# Patient Record
Sex: FEMALE | Race: WHITE | NOT HISPANIC OR LATINO | URBAN - METROPOLITAN AREA
[De-identification: names, ages, dates, MRNs, and addresses within clinical notes are randomized per-mention and may not be internally consistent; named-entity substitution may affect disease eponyms.]

---

## 2024-10-18 ENCOUNTER — OFFICE VISIT (OUTPATIENT)
Dept: URGENT CARE | Age: 39
End: 2024-10-18
Payer: OTHER GOVERNMENT

## 2024-10-18 VITALS
SYSTOLIC BLOOD PRESSURE: 126 MMHG | TEMPERATURE: 99.3 F | WEIGHT: 187 LBS | HEART RATE: 81 BPM | OXYGEN SATURATION: 97 % | DIASTOLIC BLOOD PRESSURE: 84 MMHG

## 2024-10-18 DIAGNOSIS — R09.82 POST-NASAL DRAINAGE: Primary | ICD-10-CM

## 2024-10-18 DIAGNOSIS — R06.02 POST-COVID CHRONIC SHORTNESS OF BREATH: ICD-10-CM

## 2024-10-18 DIAGNOSIS — J01.10 SUBACUTE FRONTAL SINUSITIS: ICD-10-CM

## 2024-10-18 DIAGNOSIS — H65.01 RIGHT ACUTE SEROUS OTITIS MEDIA, RECURRENCE NOT SPECIFIED: ICD-10-CM

## 2024-10-18 DIAGNOSIS — U09.9 POST-COVID CHRONIC SHORTNESS OF BREATH: ICD-10-CM

## 2024-10-18 DIAGNOSIS — R05.1 ACUTE COUGH: ICD-10-CM

## 2024-10-18 LAB
POC RAPID INFLUENZA A: NEGATIVE
POC RAPID INFLUENZA B: NEGATIVE

## 2024-10-18 RX ORDER — ALBUTEROL SULFATE 90 UG/1
2 INHALANT RESPIRATORY (INHALATION) EVERY 6 HOURS PRN
Qty: 18 G | Refills: 0 | Status: SHIPPED | OUTPATIENT
Start: 2024-10-18 | End: 2025-10-18

## 2024-10-18 RX ORDER — DOXYCYCLINE HYCLATE 100 MG
100 TABLET ORAL 2 TIMES DAILY
Qty: 20 TABLET | Refills: 0 | Status: SHIPPED | OUTPATIENT
Start: 2024-10-18 | End: 2024-10-25

## 2024-10-18 ASSESSMENT — ENCOUNTER SYMPTOMS
ENDOCRINE NEGATIVE: 1
GASTROINTESTINAL NEGATIVE: 1
EYES NEGATIVE: 1
SORE THROAT: 1
CARDIOVASCULAR NEGATIVE: 1
COUGH: 1
SINUS PRESSURE: 1

## 2024-10-18 NOTE — PROGRESS NOTES
Subjective   Patient ID: Caitlin Kelley is a 39 y.o. female. They present today with a chief complaint of Cough (Patient states cough for 2 weeks getting worse. Patient states upper chest burning when coughing and breathing started today. ).    History of Present Illness  38 y/o F c/o Cough Patient states cough for 2 weeks getting worse. Patient states upper chest burning when coughing and breathing started today. Pt was diagnosed with Covid 19 1 month ago and then not too long after s/s started with progression and cough worse at night and AM. Denies SOB, CP, fever, chills, n/v      Cough  Associated symptoms include a sore throat.       Past Medical History  Allergies as of 10/18/2024 - Reviewed 10/18/2024   Allergen Reaction Noted    Penicillins Other 10/18/2024       (Not in a hospital admission)       History reviewed. No pertinent past medical history.    History reviewed. No pertinent surgical history.     reports that she has never smoked. She has never used smokeless tobacco.    Review of Systems  Review of Systems   HENT:  Positive for congestion, sinus pressure and sore throat.    Eyes: Negative.    Respiratory:  Positive for cough.    Cardiovascular: Negative.    Gastrointestinal: Negative.    Endocrine: Negative.    Genitourinary: Negative.                                   Objective    Vitals:    10/18/24 1728   BP: 126/84   Pulse: 81   Temp: 37.4 °C (99.3 °F)   SpO2: 97%   Weight: 84.8 kg (187 lb)     No LMP recorded.    Physical Exam  Constitutional:       Appearance: Normal appearance.   HENT:      Head: Normocephalic.      Right Ear: Tympanic membrane normal.      Left Ear: Tympanic membrane normal.      Ears:      Comments: Right TM with bulging/erythema/opacification     Nose:      Right Turbinates: Enlarged.      Left Turbinates: Enlarged.      Right Sinus: Frontal sinus tenderness present.      Mouth/Throat:      Mouth: Mucous membranes are dry.      Pharynx: Oropharynx is clear. Postnasal drip  present.   Eyes:      Extraocular Movements: Extraocular movements intact.      Pupils: Pupils are equal, round, and reactive to light.   Neck:      Comments: Right AC with tenderness  Cardiovascular:      Rate and Rhythm: Normal rate and regular rhythm.      Pulses: Normal pulses.      Heart sounds: Normal heart sounds.   Pulmonary:      Effort: Pulmonary effort is normal.      Breath sounds: Normal breath sounds.   Musculoskeletal:         General: Normal range of motion.      Cervical back: Normal range of motion and neck supple.   Lymphadenopathy:      Cervical: Cervical adenopathy present.   Skin:     General: Skin is warm and dry.   Neurological:      General: No focal deficit present.      Mental Status: She is alert and oriented to person, place, and time.   Psychiatric:         Mood and Affect: Mood normal.         Behavior: Behavior normal.         Procedures    Point of Care Test & Imaging Results from this visit  Results for orders placed or performed in visit on 10/18/24   POCT Influenza A/B manually resulted   Result Value Ref Range    POC Rapid Influenza A Negative Negative    POC Rapid Influenza B Negative Negative      No results found.    Diagnostic study results (if any) were reviewed by PRINCE Ball.    Assessment/Plan   Allergies, medications, history, and pertinent labs/EKGs/Imaging reviewed by PRINCE Ball.     Medical Decision Making  Rapid flu- NEG  Per s/s clinical features as well as duration and abnormal PE, diagnosis is consistent with right AOM aND suspect sinusitis so will treat with DOXYCYCLINE, and advised to implement normal saline mist spray, flonase daily, and if s/s persist after 24-48 hours to f/u with PCP    refrain from swimming and flying until f/u with PCP  Normal saline mist spray 3 times a day to clear out sinuses and reduce PND  Advised to take daily anti-histamines   alternate Tylenol and Motrin PRN for discomfort  gargle with warm water and  salt  f/u PCP 2-3 days  Take medication as prescribed and incorporate probiotics for gut health  normal saline mist spray three times and day and flonase daily  Increase fluids, to help thin congestion. You might use a cool mist humidifier/vaporizer in your room if the air is dry. This will help thin congestion and keep your sinus moist.  Sleeping in a more upright position can be helpful.  Using saline nose drops or mists can also help thick sinus congestion drain. Apply the mist or drops, then tip your head back and rotate from side to side to help    You can use acetaminophen (paracetamol, Tylenol) or ibuprofen (Motrin) for fever or headache. Drink warm teas with honey.  If you do not improve or you begin to worsen please follow up with your primary care physician.      HOME CARE INSTRUCTIONS:  --- Drink plenty of water. Water helps thin the mucus so your sinuses can drain more easily.  --- Use a humidifier.  --- Inhale steam 3 to 4 times a day (for example, sit in the bathroom with the shower running).  --- Apply a warm, moist washcloth to your face 3 to 4 times a day, or as directed by your caregiver.  --- Take over-the-counter or prescription medicines for pain, discomfort, or fever only as directed by your caregiver.    SEEK FURTHER TREATMENT IF YOU:  --- You have increasing pain or severe headaches.  --- You have nausea, vomiting, or drowsiness.  --- You have swelling around your face.  --- You have vision problems.  --- You have a stiff neck.  --- You have difficulty breathing.      Orders and Diagnoses  Diagnoses and all orders for this visit:  Post-nasal drainage  Acute cough  -     POCT Influenza A/B manually resulted  -     albuterol 90 mcg/actuation inhaler; Inhale 2 puffs every 6 hours if needed for wheezing or shortness of breath.  Subacute frontal sinusitis  -     doxycycline (Vibra-Tabs) 100 mg tablet; Take 1 tablet (100 mg) by mouth 2 times a day for 7 days. Take with a full glass of water and do  not lie down for at least 30 minutes after.  Right acute serous otitis media, recurrence not specified  -     doxycycline (Vibra-Tabs) 100 mg tablet; Take 1 tablet (100 mg) by mouth 2 times a day for 7 days. Take with a full glass of water and do not lie down for at least 30 minutes after.  Post-COVID chronic shortness of breath  -     albuterol 90 mcg/actuation inhaler; Inhale 2 puffs every 6 hours if needed for wheezing or shortness of breath.      Medical Admin Record      Patient disposition: Home    Electronically signed by PRINCE Ball  6:13 PM

## 2025-01-23 ENCOUNTER — OFFICE VISIT (OUTPATIENT)
Dept: URGENT CARE | Age: 40
End: 2025-01-23
Payer: OTHER GOVERNMENT

## 2025-01-23 VITALS
SYSTOLIC BLOOD PRESSURE: 124 MMHG | DIASTOLIC BLOOD PRESSURE: 76 MMHG | TEMPERATURE: 98.4 F | RESPIRATION RATE: 20 BRPM | OXYGEN SATURATION: 98 % | HEART RATE: 75 BPM

## 2025-01-23 DIAGNOSIS — H66.003 ACUTE SUPPURATIVE OTITIS MEDIA OF BOTH EARS WITHOUT SPONTANEOUS RUPTURE OF TYMPANIC MEMBRANES, RECURRENCE NOT SPECIFIED: ICD-10-CM

## 2025-01-23 DIAGNOSIS — H60.331 ACUTE SWIMMER'S EAR OF RIGHT SIDE: Primary | ICD-10-CM

## 2025-01-23 RX ORDER — CLINDAMYCIN HYDROCHLORIDE 300 MG/1
300 CAPSULE ORAL 3 TIMES DAILY
Qty: 21 CAPSULE | Refills: 0 | Status: SHIPPED | OUTPATIENT
Start: 2025-01-23 | End: 2025-01-30

## 2025-01-23 RX ORDER — NEOMYCIN SULFATE, POLYMYXIN B SULFATE, HYDROCORTISONE 3.5; 10000; 1 MG/ML; [USP'U]/ML; MG/ML
3 SOLUTION/ DROPS AURICULAR (OTIC) 4 TIMES DAILY
Qty: 6 ML | Refills: 0 | Status: SHIPPED | OUTPATIENT
Start: 2025-01-23 | End: 2025-02-02

## 2025-01-23 ASSESSMENT — PAIN SCALES - GENERAL: PAINLEVEL_OUTOF10: 4

## 2025-01-23 NOTE — PROGRESS NOTES
Subjective   Patient ID: Caitlin Kelley is a 39 y.o. female. They present today with a chief complaint of Earache.    History of Present Illness    Earache         Past Medical History  Allergies as of 01/23/2025 - Reviewed 01/23/2025   Allergen Reaction Noted    Penicillins Other 10/18/2024       (Not in a hospital admission)       No past medical history on file.    No past surgical history on file.     reports that she has never smoked. She has never used smokeless tobacco.    Review of Systems  Review of Systems   HENT:  Positive for ear pain.    All other systems reviewed and are negative.                                 Objective    Vitals:    01/23/25 1605   BP: 124/76   Pulse: 75   Resp: 20   Temp: 36.9 °C (98.4 °F)   TempSrc: Oral   SpO2: 98%     No LMP recorded.    Physical Exam  Vitals and nursing note reviewed.   Constitutional:       Appearance: Normal appearance.   HENT:      Head: Normocephalic.      Right Ear: Tenderness present. Tympanic membrane is erythematous and retracted.      Left Ear: Tympanic membrane is erythematous and retracted.      Ears:      Comments: Right ear canal with mild to moderate erythema with swelling, +tragus pull     Nose: Nose normal.      Mouth/Throat:      Mouth: Mucous membranes are dry.      Pharynx: Oropharynx is clear.   Eyes:      Extraocular Movements: Extraocular movements intact.      Pupils: Pupils are equal, round, and reactive to light.   Cardiovascular:      Rate and Rhythm: Normal rate and regular rhythm.      Pulses: Normal pulses.      Heart sounds: Normal heart sounds.   Pulmonary:      Effort: Pulmonary effort is normal.      Breath sounds: Normal breath sounds.   Musculoskeletal:         General: Normal range of motion.      Cervical back: Normal range of motion and neck supple.   Skin:     General: Skin is warm and dry.   Neurological:      General: No focal deficit present.      Mental Status: She is alert and oriented to person, place, and time.    Psychiatric:         Mood and Affect: Mood normal.         Behavior: Behavior normal.         Procedures    Point of Care Test & Imaging Results from this visit  No results found for this visit on 01/23/25.   No results found.    Diagnostic study results (if any) were reviewed by PRINCE Ball.    Assessment/Plan   Allergies, medications, history, and pertinent labs/EKGs/Imaging reviewed by PRINCE Ball.     Medical Decision Making      suspect mild to moderate b/l AOM with right moderate AOE, so treatment initiated with clindamycin, d/t allergy to PNC anaphylaxis, and neomycin otic drops and advised to implement normal saline mist spray, flonase daily, and if s/s persist after 24-48 hours to f/u with PCP    refrain from swimming and flying until f/u with PCP  Normal saline mist spray 3 times a day to clear out sinuses and reduce PND  Advised to take daily anti-histamines   alternate Tylenol and Motrin PRN for discomfort  gargle with warm water and salt  f/u PCP 2-3 days    Follow up Care: Pt instructed to follow-up with PCP or other appropriate clinician within 24 to 48 hours. Report to ED if there is any development in worsening pain, difficulty swallowing, change in phonation, fever, chills, neck pain, photophobia, headache, neck stiffness, chest pain, abdominal pain, vomiting, syncope, hemoptysis, leg swelling SOB, fever, facial swelling, eye pain, periorbital swelling/erythema, or any new signs or sx.     The patient was educated regarding diagnosis, supportive care, OTC and Rx medications. The patient was given the opportunity to ask questions prior to discharge. They verbalized understanding of my discussion of the plans for treatment, expected course, indications to return to  or seek further evaluation in ED, and the need for timely follow up as directed.         SEEK FURTHER TREATMENT IF YOU:  --- You have increasing pain or severe headaches.  --- You have nausea, vomiting,  or drowsiness.  --- You have swelling around your face.  --- You have vision problems.  --- You have a stiff neck.  --- You have difficulty breathing.      Orders and Diagnoses  Diagnoses and all orders for this visit:  Acute swimmer's ear of right side  -     neomycin-polymyxin-HC (Cortisporin) otic solution; Administer 3 drops into the right ear 4 times a day for 10 days.  Acute suppurative otitis media of both ears without spontaneous rupture of tympanic membranes, recurrence not specified  -     clindamycin (Cleocin HCL) 300 mg capsule; Take 1 capsule (300 mg) by mouth 3 times a day for 7 days.      Medical Admin Record      Patient disposition: Home    Electronically signed by PRINCE Ball  4:35 PM